# Patient Record
Sex: FEMALE | Race: WHITE | Employment: OTHER | URBAN - METROPOLITAN AREA
[De-identification: names, ages, dates, MRNs, and addresses within clinical notes are randomized per-mention and may not be internally consistent; named-entity substitution may affect disease eponyms.]

---

## 2019-08-23 ENCOUNTER — HOSPITAL ENCOUNTER (OUTPATIENT)
Dept: MAMMOGRAPHY | Age: 74
Discharge: HOME | End: 2019-08-25
Payer: MEDICARE

## 2019-08-23 DIAGNOSIS — Z12.31 SCREENING MAMMOGRAM, ENCOUNTER FOR: ICD-10-CM

## 2019-08-23 PROCEDURE — 77067 SCR MAMMO BI INCL CAD: CPT

## 2020-03-03 ENCOUNTER — OFFICE VISIT (OUTPATIENT)
Dept: PHYSICAL MEDICINE AND REHAB | Age: 75
End: 2020-03-03
Payer: MEDICARE

## 2020-03-03 VITALS — BODY MASS INDEX: 37.21 KG/M2 | WEIGHT: 210 LBS | HEIGHT: 63 IN

## 2020-03-03 PROCEDURE — G8484 FLU IMMUNIZE NO ADMIN: HCPCS | Performed by: PHYSICAL MEDICINE & REHABILITATION

## 2020-03-03 PROCEDURE — 1123F ACP DISCUSS/DSCN MKR DOCD: CPT | Performed by: PHYSICAL MEDICINE & REHABILITATION

## 2020-03-03 PROCEDURE — 3017F COLORECTAL CA SCREEN DOC REV: CPT | Performed by: PHYSICAL MEDICINE & REHABILITATION

## 2020-03-03 PROCEDURE — 1090F PRES/ABSN URINE INCON ASSESS: CPT | Performed by: PHYSICAL MEDICINE & REHABILITATION

## 2020-03-03 PROCEDURE — 95886 MUSC TEST DONE W/N TEST COMP: CPT | Performed by: PHYSICAL MEDICINE & REHABILITATION

## 2020-03-03 PROCEDURE — G8417 CALC BMI ABV UP PARAM F/U: HCPCS | Performed by: PHYSICAL MEDICINE & REHABILITATION

## 2020-03-03 PROCEDURE — 4040F PNEUMOC VAC/ADMIN/RCVD: CPT | Performed by: PHYSICAL MEDICINE & REHABILITATION

## 2020-03-03 PROCEDURE — 95913 NRV CNDJ TEST 13/> STUDIES: CPT | Performed by: PHYSICAL MEDICINE & REHABILITATION

## 2020-03-03 PROCEDURE — G8428 CUR MEDS NOT DOCUMENT: HCPCS | Performed by: PHYSICAL MEDICINE & REHABILITATION

## 2020-03-03 PROCEDURE — 1036F TOBACCO NON-USER: CPT | Performed by: PHYSICAL MEDICINE & REHABILITATION

## 2020-03-03 PROCEDURE — G8400 PT W/DXA NO RESULTS DOC: HCPCS | Performed by: PHYSICAL MEDICINE & REHABILITATION

## 2020-03-03 PROCEDURE — 99202 OFFICE O/P NEW SF 15 MIN: CPT | Performed by: PHYSICAL MEDICINE & REHABILITATION

## 2020-03-05 NOTE — PROGRESS NOTES
[address] [facility]  Electrodiagnostic Laboratory  *Accredited by the [address] [place] [address] with exemplary status  [address] [facility] [address]  Phone: [phone]  Fax: [phone]      Date of Examination: 03/05/20  Patient Name: Matt Sheehan  is a 76y.o. year old female who was seen due to complaints of   Chief Complaint   Patient presents with    Hand Numbness     Generalized numbness and tingling in both hands R>L digits 1-3    Wrist Pain     Right wrist pain present for approximately 1-2 months     that started after no injury. Physical Exam: MSK: There is no joint effusion, deformity, instability, swelling, erythema or warmth. AROM is full in the spine and extremities. +Tinel right wrist. Atrophy present right thenar. Neurologic:  No focal sensorimotor deficit. Reflexes 2+ and symmetric. Gait is normal.    Impression:   1. Polyneuropathy        Plan:   · EMG is indicated to evaluate the above diagnosis. Orders Placed This Encounter   Procedures    EMG     Standing Status:   Future     Standing Expiration Date:   3/3/2021     Order Specific Question:   Which body part? Answer:   BUE     · EMG was done today and showed right carpal tunnel syndrome, severe. The patient was educated about the diagnosis and the prognosis. · Recommend surgical consultation. · Advised patient to follow up with referring provider. Thank you for allowing me to participate in the care of your patient.       Sincerely,     Nela Stokes

## 2021-02-02 ENCOUNTER — IMMUNIZATION (OUTPATIENT)
Dept: PRIMARY CARE CLINIC | Age: 76
End: 2021-02-02
Payer: MEDICARE

## 2021-02-02 DIAGNOSIS — Z23 NEED FOR VACCINATION: Primary | ICD-10-CM

## 2021-02-02 PROCEDURE — 91300 COVID-19, PFIZER VACCINE 30MCG/0.3ML DOSE: CPT | Performed by: NURSE PRACTITIONER

## 2021-02-02 PROCEDURE — 0001A COVID-19, PFIZER VACCINE 30MCG/0.3ML DOSE: CPT | Performed by: NURSE PRACTITIONER

## 2021-02-23 ENCOUNTER — IMMUNIZATION (OUTPATIENT)
Dept: PRIMARY CARE CLINIC | Age: 76
End: 2021-02-23
Payer: MEDICARE

## 2021-02-23 PROCEDURE — 0002A COVID-19, PFIZER VACCINE 30MCG/0.3ML DOSE: CPT | Performed by: NURSE PRACTITIONER

## 2021-02-23 PROCEDURE — 91300 COVID-19, PFIZER VACCINE 30MCG/0.3ML DOSE: CPT | Performed by: NURSE PRACTITIONER

## 2021-10-03 ENCOUNTER — HOSPITAL ENCOUNTER (EMERGENCY)
Age: 76
Discharge: HOME | End: 2021-10-03
Payer: MEDICARE

## 2021-10-03 ENCOUNTER — APPOINTMENT (OUTPATIENT)
Dept: GENERAL RADIOLOGY | Age: 76
End: 2021-10-03
Payer: MEDICARE

## 2021-10-03 VITALS
SYSTOLIC BLOOD PRESSURE: 111 MMHG | HEART RATE: 97 BPM | DIASTOLIC BLOOD PRESSURE: 78 MMHG | BODY MASS INDEX: 38.26 KG/M2 | RESPIRATION RATE: 18 BRPM | OXYGEN SATURATION: 97 % | TEMPERATURE: 97.3 F | WEIGHT: 216 LBS

## 2021-10-03 DIAGNOSIS — S63.91XA SPRAIN OF RIGHT HAND, INITIAL ENCOUNTER: Primary | ICD-10-CM

## 2021-10-03 PROCEDURE — 99211 OFF/OP EST MAY X REQ PHY/QHP: CPT

## 2021-10-03 PROCEDURE — 73110 X-RAY EXAM OF WRIST: CPT

## 2021-10-03 PROCEDURE — 73130 X-RAY EXAM OF HAND: CPT

## 2021-10-03 RX ORDER — LISINOPRIL 10 MG/1
10 TABLET ORAL DAILY
COMMUNITY

## 2021-10-03 RX ORDER — ATORVASTATIN CALCIUM 20 MG/1
20 TABLET, FILM COATED ORAL DAILY
COMMUNITY

## 2021-10-03 RX ORDER — METFORMIN HYDROCHLORIDE 500 MG/1
500 TABLET, EXTENDED RELEASE ORAL
COMMUNITY

## 2021-10-03 RX ORDER — LEVOTHYROXINE SODIUM 88 UG/1
88 TABLET ORAL DAILY
COMMUNITY

## 2021-10-03 RX ORDER — OMEPRAZOLE 40 MG/1
40 CAPSULE, DELAYED RELEASE ORAL DAILY
COMMUNITY

## 2021-10-03 ASSESSMENT — PAIN DESCRIPTION - ORIENTATION: ORIENTATION: RIGHT

## 2021-10-03 ASSESSMENT — PAIN SCALES - GENERAL: PAINLEVEL_OUTOF10: 4

## 2021-10-03 ASSESSMENT — PAIN DESCRIPTION - LOCATION: LOCATION: HAND

## 2021-10-03 ASSESSMENT — PAIN DESCRIPTION - DESCRIPTORS: DESCRIPTORS: ACHING

## 2021-10-03 ASSESSMENT — PAIN DESCRIPTION - PAIN TYPE: TYPE: ACUTE PAIN

## 2021-10-03 NOTE — ED PROVIDER NOTES
Department of Emergency Medicine  [address] [place] [address]  Provider Note  Admit Date/Time: 10/3/2021 12:38 PM  Room: 04/04  MRN: [id number]  Chief Complaint: Hand Injury (right hand/wrist pain in the back of her hand started this AM, pushed herself up to get out of bed)       History of Present Illness   Source of history provided by: Patient. History/Exam Limitations: None. Marlene Lopez is a 68 y.o. female with a history of hypertension, dyslipidemia, and DM. She reports she was getting out of bed this morning when she pushed herself up with her right hand and felt some pain on the dorsal wrist and mid hand which has persisted since. There was no fall or direct injury. Denies any distal paresthesias or weakness. ROS    Pertinent positives and negatives are stated within HPI, all other systems reviewed and are negative. Past Surgical History:   Procedure Laterality Date    BLADDER SUSPENSION      CARPAL TUNNEL RELEASE      FRACTURE SURGERY Left     ashley and screws leg    HERNIA REPAIR      HYSTERECTOMY      JOINT REPLACEMENT      KIDNEY STONE SURGERY      THYROID SURGERY     Social History:  reports that she has never smoked. She has never used smokeless tobacco.  Family History: family history is not on file. Allergies: Omnipen [ampicillin], Penicillins, and Terramycin [oxytetracycline]    Physical Exam   Oxygen Saturation Interpretation: Normal.   ED Triage Vitals   BP Temp Temp src Pulse Resp SpO2 Height Weight   -- -- -- -- -- -- -- --     Physical Exam   Physical Exam:  General: Vitals noted, no distress. Afebrile. Cardiac: Regular, rate, rhythm, no murmur. Pulmonary: Lungs clear bilaterally with good aeration. No adventitious breath sounds. Abdomen: Soft, nontender, nonsurgical. No peritoneal signs. Normoactive bowel sounds.    Extremities: Exam of the right wrist shows some tenderness over the dorsal mid wrist as well as over the third and fourth metacarpal regions. The skin is intact. Is neurovascularly intact distally including full strength with flexion and extension. No obvious tendon injury. Is nontender over the remainder of the wrist and hand. Nontender of the anatomic snuffbox as well. No discomfort with axial loading of the thumb. The remainder of the extremity is nontender as well. Skin: No rash. Neuro: No gross neurologic deficits. Lab / Imaging Results   (All laboratory and radiology results have been personally reviewed by myself)  Labs:  No results found for this visit on 10/03/21. Imaging: All Radiology results interpreted by Radiologist unless otherwise noted. XR HAND RIGHT (MIN 3 VIEWS)   Final Result   Osteopenia      Degenerative changes at the IP joint of the thumb      Otherwise no acute findings         XR WRIST RIGHT (MIN 3 VIEWS)   Final Result   Osteopenia      Degenerative changes at the radiocarpal joint space             ED Course / Medical Decision Making   Medications - No data to display       Consult(s):   None    Procedure(s):   None. Differential Diagnosis: Is extensive but includes fracture, dislocation, nonvisualized/occult fracture, tendon/ligament injury, foreign body, soft tissue injury, etc.    MDM:   This is a 68 y.o. female who presents with the above history. On exam, there is some tenderness over the dorsal mid wrist and third and fourth metacarpal regions but is neurovascularly intact distally. No evidence of neurovascular nor tendon injury. X-rays show no fracture or dislocation. Will be given a Velcro wrist splint. Home-going with NSAIDs and orthopedic follow-up. Counseling: I discussed the differential, results and discharge plan with the patient and/or family/friend/caregiver if present. I emphasized the importance of follow-up with the physician I referred them to in the timeframe recommended. I explained reasons for the patient to return to the Emergency Department.  Additional verbal discharge instructions were also given and discussed with the patient to supplement those generated by the EMR. We also discussed medications that were prescribed (if any) including common side effects and interactions. The patient was advised to abstain from driving, operating heavy machinery or making significant decisions while taking medications such as opiates and muscle relaxers that may impair this. All questions were addressed. They understand return precautions and discharge instructions. The patient and/or family/friend/caregiver expressed understanding. Assessment      1. Sprain of right hand, initial encounter      Plan   Discharge to home and advised to contact Anna Marie Mathew,  [address] [facility] [address] [facility]  [phone]      As needed   Patient condition is good    New Medications     New Prescriptions    No medications on file     Electronically signed by CASE Ann   DD: 10/3/21  **This report was transcribed using voice recognition software. Every effort was made to ensure accuracy; however, inadvertent computerized transcription errors may be present.   END OF ED PROVIDER NOTE          Levander Spurling [address] [place], [place]  10/03/21 [address] [facility]  10/03/21 [id number]

## 2021-12-07 ENCOUNTER — IMMUNIZATION (OUTPATIENT)
Dept: PRIMARY CARE CLINIC | Age: 76
End: 2021-12-07
Payer: MEDICARE

## 2021-12-07 PROCEDURE — 0064A COVID-19, MODERNA BOOSTER VACCINE 0.25ML DOSE: CPT | Performed by: NURSE PRACTITIONER

## 2021-12-07 PROCEDURE — 91306 COVID-19, MODERNA BOOSTER VACCINE 0.25ML DOSE: CPT | Performed by: NURSE PRACTITIONER

## 2022-03-20 ENCOUNTER — APPOINTMENT (OUTPATIENT)
Dept: GENERAL RADIOLOGY | Age: 77
End: 2022-03-20
Payer: MEDICARE

## 2022-03-20 ENCOUNTER — APPOINTMENT (OUTPATIENT)
Dept: CT IMAGING | Age: 77
End: 2022-03-20
Payer: MEDICARE

## 2022-03-20 ENCOUNTER — HOSPITAL ENCOUNTER (EMERGENCY)
Age: 77
Discharge: ANOTHER HOSPITAL | End: 2022-03-20
Attending: STUDENT IN AN ORGANIZED HEALTH CARE EDUCATION/TRAINING PROGRAM
Payer: MEDICARE

## 2022-03-20 VITALS
BODY MASS INDEX: 38.97 KG/M2 | OXYGEN SATURATION: 99 % | DIASTOLIC BLOOD PRESSURE: 74 MMHG | RESPIRATION RATE: 24 BRPM | SYSTOLIC BLOOD PRESSURE: 125 MMHG | WEIGHT: 220 LBS | TEMPERATURE: 98.1 F | HEART RATE: 90 BPM

## 2022-03-20 DIAGNOSIS — R07.89 CHEST TIGHTNESS: Primary | ICD-10-CM

## 2022-03-20 DIAGNOSIS — N30.00 ACUTE CYSTITIS WITHOUT HEMATURIA: ICD-10-CM

## 2022-03-20 LAB
ALBUMIN SERPL-MCNC: 3.9 G/DL (ref 3.5–5.2)
ALP BLD-CCNC: 121 U/L (ref 35–104)
ALT SERPL-CCNC: 12 U/L (ref 0–32)
ANION GAP SERPL CALCULATED.3IONS-SCNC: 14 MMOL/L (ref 7–16)
AST SERPL-CCNC: 14 U/L (ref 0–31)
BACTERIA: ABNORMAL /HPF
BASOPHILS ABSOLUTE: 0.05 E9/L (ref 0–0.2)
BASOPHILS RELATIVE PERCENT: 0.5 % (ref 0–2)
BILIRUB SERPL-MCNC: 0.2 MG/DL (ref 0–1.2)
BILIRUBIN URINE: NEGATIVE
BLOOD, URINE: NEGATIVE
BUN BLDV-MCNC: 13 MG/DL (ref 6–23)
CALCIUM SERPL-MCNC: 9 MG/DL (ref 8.6–10.2)
CHLORIDE BLD-SCNC: 100 MMOL/L (ref 98–107)
CLARITY: ABNORMAL
CO2: 23 MMOL/L (ref 22–29)
COLOR: YELLOW
CREAT SERPL-MCNC: 0.6 MG/DL (ref 0.5–1)
D DIMER: <200 NG/ML DDU
EOSINOPHILS ABSOLUTE: 0.2 E9/L (ref 0.05–0.5)
EOSINOPHILS RELATIVE PERCENT: 1.9 % (ref 0–6)
EPITHELIAL CELLS, UA: ABNORMAL /HPF
GFR AFRICAN AMERICAN: >60
GFR NON-AFRICAN AMERICAN: >60 ML/MIN/1.73
GLUCOSE BLD-MCNC: 194 MG/DL (ref 74–99)
GLUCOSE URINE: NEGATIVE MG/DL
HCT VFR BLD CALC: 38.5 % (ref 34–48)
HEMOGLOBIN: 12.5 G/DL (ref 11.5–15.5)
IMMATURE GRANULOCYTES #: 0.03 E9/L
IMMATURE GRANULOCYTES %: 0.3 % (ref 0–5)
KETONES, URINE: NEGATIVE MG/DL
LEUKOCYTE ESTERASE, URINE: ABNORMAL
LYMPHOCYTES ABSOLUTE: 3.23 E9/L (ref 1.5–4)
LYMPHOCYTES RELATIVE PERCENT: 30.7 % (ref 20–42)
MCH RBC QN AUTO: 28.5 PG (ref 26–35)
MCHC RBC AUTO-ENTMCNC: 32.5 % (ref 32–34.5)
MCV RBC AUTO: 87.7 FL (ref 80–99.9)
METER GLUCOSE: 196 MG/DL (ref 74–99)
MONOCYTES ABSOLUTE: 0.63 E9/L (ref 0.1–0.95)
MONOCYTES RELATIVE PERCENT: 6 % (ref 2–12)
NEUTROPHILS ABSOLUTE: 6.37 E9/L (ref 1.8–7.3)
NEUTROPHILS RELATIVE PERCENT: 60.6 % (ref 43–80)
NITRITE, URINE: POSITIVE
PDW BLD-RTO: 14.2 FL (ref 11.5–15)
PH UA: 6.5 (ref 5–9)
PLATELET # BLD: 247 E9/L (ref 130–450)
PMV BLD AUTO: 9.8 FL (ref 7–12)
POTASSIUM REFLEX MAGNESIUM: 3.9 MMOL/L (ref 3.5–5)
PRO-BNP: 115 PG/ML (ref 0–450)
PROTEIN UA: NEGATIVE MG/DL
RBC # BLD: 4.39 E12/L (ref 3.5–5.5)
RBC UA: ABNORMAL /HPF (ref 0–2)
SODIUM BLD-SCNC: 137 MMOL/L (ref 132–146)
SPECIFIC GRAVITY UA: 1.02 (ref 1–1.03)
TOTAL PROTEIN: 7.4 G/DL (ref 6.4–8.3)
TROPONIN, HIGH SENSITIVITY: 18 NG/L (ref 0–9)
TROPONIN, HIGH SENSITIVITY: 33 NG/L (ref 0–9)
TSH SERPL DL<=0.05 MIU/L-ACNC: 2.24 UIU/ML (ref 0.27–4.2)
UROBILINOGEN, URINE: 0.2 E.U./DL
WBC # BLD: 10.5 E9/L (ref 4.5–11.5)
WBC UA: ABNORMAL /HPF (ref 0–5)

## 2022-03-20 PROCEDURE — 87088 URINE BACTERIA CULTURE: CPT

## 2022-03-20 PROCEDURE — 85025 COMPLETE CBC W/AUTO DIFF WBC: CPT

## 2022-03-20 PROCEDURE — 84443 ASSAY THYROID STIM HORMONE: CPT

## 2022-03-20 PROCEDURE — 87077 CULTURE AEROBIC IDENTIFY: CPT

## 2022-03-20 PROCEDURE — 93005 ELECTROCARDIOGRAM TRACING: CPT | Performed by: STUDENT IN AN ORGANIZED HEALTH CARE EDUCATION/TRAINING PROGRAM

## 2022-03-20 PROCEDURE — 2580000003 HC RX 258: Performed by: STUDENT IN AN ORGANIZED HEALTH CARE EDUCATION/TRAINING PROGRAM

## 2022-03-20 PROCEDURE — 87186 SC STD MICRODIL/AGAR DIL: CPT

## 2022-03-20 PROCEDURE — 36415 COLL VENOUS BLD VENIPUNCTURE: CPT

## 2022-03-20 PROCEDURE — 84484 ASSAY OF TROPONIN QUANT: CPT

## 2022-03-20 PROCEDURE — 6360000002 HC RX W HCPCS: Performed by: STUDENT IN AN ORGANIZED HEALTH CARE EDUCATION/TRAINING PROGRAM

## 2022-03-20 PROCEDURE — 81001 URINALYSIS AUTO W/SCOPE: CPT

## 2022-03-20 PROCEDURE — 85378 FIBRIN DEGRADE SEMIQUANT: CPT

## 2022-03-20 PROCEDURE — 6370000000 HC RX 637 (ALT 250 FOR IP): Performed by: STUDENT IN AN ORGANIZED HEALTH CARE EDUCATION/TRAINING PROGRAM

## 2022-03-20 PROCEDURE — 71045 X-RAY EXAM CHEST 1 VIEW: CPT

## 2022-03-20 PROCEDURE — 96374 THER/PROPH/DIAG INJ IV PUSH: CPT

## 2022-03-20 PROCEDURE — 80053 COMPREHEN METABOLIC PANEL: CPT

## 2022-03-20 PROCEDURE — 99285 EMERGENCY DEPT VISIT HI MDM: CPT

## 2022-03-20 PROCEDURE — 70450 CT HEAD/BRAIN W/O DYE: CPT

## 2022-03-20 PROCEDURE — 82962 GLUCOSE BLOOD TEST: CPT

## 2022-03-20 PROCEDURE — 83880 ASSAY OF NATRIURETIC PEPTIDE: CPT

## 2022-03-20 RX ORDER — CEFDINIR 300 MG/1
300 CAPSULE ORAL 2 TIMES DAILY
Qty: 20 CAPSULE | Refills: 0 | Status: SHIPPED | OUTPATIENT
Start: 2022-03-20 | End: 2022-03-30

## 2022-03-20 RX ORDER — ASPIRIN 81 MG/1
324 TABLET, CHEWABLE ORAL ONCE
Status: DISCONTINUED | OUTPATIENT
Start: 2022-03-20 | End: 2022-03-20 | Stop reason: HOSPADM

## 2022-03-20 RX ORDER — ASPIRIN 81 MG/1
324 TABLET, CHEWABLE ORAL ONCE
Status: COMPLETED | OUTPATIENT
Start: 2022-03-20 | End: 2022-03-20

## 2022-03-20 RX ORDER — 0.9 % SODIUM CHLORIDE 0.9 %
500 INTRAVENOUS SOLUTION INTRAVENOUS ONCE
Status: COMPLETED | OUTPATIENT
Start: 2022-03-20 | End: 2022-03-20

## 2022-03-20 RX ADMIN — SODIUM CHLORIDE 500 ML: 9 INJECTION, SOLUTION INTRAVENOUS at 08:45

## 2022-03-20 RX ADMIN — WATER 1000 MG: 1 INJECTION INTRAMUSCULAR; INTRAVENOUS; SUBCUTANEOUS at 08:44

## 2022-03-20 RX ADMIN — ASPIRIN 81 MG 324 MG: 81 TABLET ORAL at 08:43

## 2022-03-20 ASSESSMENT — ENCOUNTER SYMPTOMS
VOMITING: 0
EYE REDNESS: 0
WHEEZING: 0
SHORTNESS OF BREATH: 1
BACK PAIN: 0
SINUS PRESSURE: 0
NAUSEA: 0
EYE PAIN: 0
SORE THROAT: 0
DIARRHEA: 0
COUGH: 0
EYE DISCHARGE: 0
ABDOMINAL DISTENTION: 0

## 2022-03-20 ASSESSMENT — PAIN SCALES - GENERAL
PAINLEVEL_OUTOF10: 0
PAINLEVEL_OUTOF10: 0

## 2022-03-20 NOTE — CONSULTS
[address]                                  CONSULTATION    PATIENT NAME: Olga White                     :        1945  MED REC NO:   [id number]                            ROOM:       09  ACCOUNT NO:   [de-identified]                           ADMIT DATE: 2022  PROVIDER:     Asuncion Hdez MD    CONSULT DATE:  2022    HISTORY OF PRESENT ILLNESS:  The patient is a 27-year-old lady who I  apparently saw in November of last year in my office for preop cardiac  evaluation prior to cataract surgery. According to her, she had some  abnormality on the EKG done prior to her surgery. So she was referred  to me. She had a stress test at that time showing no evidence of  ischemia. I did not see her since then. The patient presented to the emergency room early this morning with  complaint of chest pain. She stated that she went to sleep, feeling okay. She woke up around 4  o'clock to go to the bathroom. She started to feel tightness, like  chest pain in the midsternal area while in the bathroom. She felt  somewhat lightheaded. There was no radiation of the pain. She went back to her bed. She sat down. The pain started to subside  after about 5 or 7 minutes. She got concerned with this episode of chest pain and she decided to  come to the emergency room. Her troponin high-sensitivity was elevated initially around 18. Repeat  sets came more elevated at 33. Cardiac consult was requested. I came to see the patient while she was still in emergency room. She  was chest pain free at that time. The patient does not recall having this complaint of chest pain before. PAST MEDICAL HISTORY:  As above plus type 2 diabetes. Hyperlipidemia. Hypothyroidism. HABITS:  No history of smoking or excessive alcohol drinking.     REVIEW OF SYSTEMS:  CONSTITUTIONAL:  No fever or chills. HEENT:  No nosebleed. No hearing problem. No double vision. No  stridor. No hoarseness of the voice. CARDIAC:  Complaint of chest pain. No palpitation. PULMONARY:  Some shortness of breath with activities. No orthopnea or  PND. GASTROENTEROLOGY:  No abdominal pain. No vomiting. No diarrhea. GENITOURINARY:  No dysuria or frequency. No bladder or kidney tumor. NEUROLOGIC:  No clear history of seizure or stroke. No syncope. HEMATOLOGY:  No bleeding disorder. No adenopathy. ENDOCRINOLOGY:  No polyuria or polydipsia. SKIN:  No skin disorder. MUSCULOSKELETAL:  The patient has some arthritis. PSYCHIATRIC:  No depression or suicidal ideation. PHYSICAL EXAMINATION:  GENERAL:  The patient was lying in a stretcher in the emergency room. Her son was next to her. She was chest pain free. VITAL SIGNS:  Blood pressure 141/75, pulse around 90, temperature 98. 1. NECK:  No JVD. HEART:  Regular S1 and S2. No S3. Systolic murmur, 1/6, heard best at  the left sternal border. LUNGS:  No rales, no wheezing. ABDOMEN:  Soft, nontender. EXTREMITIES:  No edema, cyanosis, or clubbing. NEUROLOGIC:  Alert and awake with no focal deficits. EKG in the ER showing sinus rhythm with no acute ischemic changes. LABORATORY DATA:  Initial troponin is 18, repeat one is 33. WBC 10.5,  hemoglobin 12.5, platelet count 371. Sodium 137, potassium 3.9, BUN 13,  creatinine 0.6. ProBNP is 115. TSH 2.2. Chest x-ray is unremarkable. IMPRESSION:  Chest pain. The patient presented with new-onset chest pain lasted few minutes this  morning. She had mild up trending troponin elevation, more consistent with  subendocardial infarction. Her EKG is not showing acute ischemic changes. The patient has multiple cardiac risk factors with hyperlipidemia and  type 2 diabetes. She had stress test done about 5 months ago showing no clear evidence of  ischemia.     The patient needs further coronary workup including either coronary CTA  or even cardiac catheterization. Both tests cannot be done at [facility] and the ER physician  here, Dr. Sami Suggs, recommended the patient being transferred to  [facility] for further coronary workup and I feel this is a good  idea. Arrangement was made for the patient to be transferred and I will  continue to see her at [facility].    Meanwhile we will give the patient a dose of Lovenox subcu. She already  got her aspirin. Long discussion with the son and the patient about all above. All their  questions were answered.         Ceferino Ramon MD    D: 03/20/2022 10:04:14       T: 03/20/2022 10:07:54     [email or web address]  Job#: [id number]     Doc#: [id number]    CC:

## 2022-03-20 NOTE — ED PROVIDER NOTES
Corwin aCldwell is a 68 y.o. female with a PMHx significant for DM, HLD, DM, thyroid who presents for evaluation of chest discomfort, feeling off, beginning prior to arrival.  The complaint has been persistent, moderate in severity, and worsened by nothing. The patient states that she woke up this morning at 0 400 went to the bathroom. Notes when she was going to the bathroom she felt a little lightheaded and just off. She notes she had a short episode of some chest discomfort which has not subsided. States that currently she just feels off. She is unable to specify beyond that. Notes that she recently did have cataract surgery and her colors have been much brighter than prior. Denies any nausea, vomiting, diarrhea, constipation, chest pain, but does states she does feel some shortness of breath at this time which is new. States she went to bed feeling normal yesterday. The history is provided by the patient and medical records. Review of Systems   Constitutional: Negative for chills and fever. HENT: Negative for ear pain, sinus pressure and sore throat. Eyes: Negative for pain, discharge and redness. Respiratory: Positive for shortness of breath. Negative for cough and wheezing. Cardiovascular: Negative for chest pain. Gastrointestinal: Negative for abdominal distention, diarrhea, nausea and vomiting. Genitourinary: Negative for dysuria and frequency. Musculoskeletal: Negative for arthralgias and back pain. Skin: Negative for rash and wound. Neurological: Positive for light-headedness. Negative for weakness and headaches. Hematological: Negative for adenopathy. All other systems reviewed and are negative. Physical Exam  Vitals and nursing note reviewed. Constitutional:       General: She is not in acute distress. Appearance: Normal appearance. She is well-developed. She is not ill-appearing. HENT:      Head: Normocephalic and atraumatic.       Right Ear: External ear normal.      Left Ear: External ear normal.   Eyes:      General:         Right eye: No discharge. Left eye: No discharge. Extraocular Movements: Extraocular movements intact. Conjunctiva/sclera: Conjunctivae normal.   Cardiovascular:      Rate and Rhythm: Normal rate and regular rhythm. Heart sounds: Normal heart sounds. No murmur heard. Pulmonary:      Effort: Pulmonary effort is normal. No respiratory distress. Breath sounds: Normal breath sounds. No stridor. Abdominal:      General: There is no distension. Palpations: Abdomen is soft. There is no mass. Tenderness: There is no abdominal tenderness. Hernia: No hernia is present. Musculoskeletal:         General: No swelling, tenderness or deformity. Normal range of motion. Cervical back: Normal range of motion and neck supple. Skin:     General: Skin is warm. Coloration: Skin is not jaundiced or pale. Findings: No rash. Neurological:      General: No focal deficit present. Mental Status: She is alert and oriented to person, place, and time. Cranial Nerves: No cranial nerve deficit. Coordination: Coordination normal.          Procedures     [facility]     ED Course as of 03/20/22 0934   Sun Mar 20, 2022   0553 EKG: This EKG is signed and interpreted by me. Rate: 99  Rhythm: Sinus  Interpretation: non-specific EKG, no ST ovation, depressions, DC 28, QRS 82, QTc 474, nonspecific T waves  Comparison: no previous EKG available      [BB]   7083 Patient re-evaluated. States that she does feel improved at this time. Notes that her chest discomfort was only for about five minutes and felt like chest tightness. [BB]   [id number] Notes that she just saw Dr. Jose Carranza in November for clearance for her surgery. She had a negative echo and stress test at that point in time. [BB]   0811 8:11 AM EDT  I received this patient at sign out from Dr. Han Lebron.   I have discussed the patient's initial exam, treatment and plan of care with the out going physician. I have introduced my self to the patient / family and have answered their questions to this point. I have examined the patient myself and reviewed ordered tests / medications and  reviewed any available results to this point. If a resident is involved in the Emergency Department care, I have discussed my findings and plan with them as well. [NC]   [id number] Patient resting comfortably in the bed in no distress, she states she has no chest tightness or pain right now and no shortness of breath. She describes episode of waking up this morning, going to the bathroom about 4 AM and developing a chest tightness across her chest mostly midsternal slightly to the left with no particular migration or radiation but felt diaphoretic, clammy, mildly short of breath and lightheaded with the episode lasted several minutes and started to come down on its own, has since completely resolved. Has never had a heart catheterization although about 6 months ago had a stress test.  No recent travel or surgeries, no leg pain or swelling. No history of blood clots. No current symptoms. Troponin noted to be elevating mildly and changing during her stay here. [NC]   [id number]   EKG reviewed, normal sinus rhythm with no acute ischemic changes. [NC]   4010 Case discussed with Dr. Hitesh Stovall, detailed over given, does recommend consulting with her cardiologist Dr. Jose Luis Boogie, he will admit the patient if requested by cardiology [NC]   0819 Case discussed with Dr. Jose Luis Boogie, detailed overview given, he does recommend patient be transferred to [facility] for further cardiac evaluation not available at [facility] if possible. [NC]   8104 Case discussed with Samson Saunders at [facility].  Case discussed. She will contact admitting physician for Dr. Yang Leach at [facility], they will either have them accept or have him call me for report as needed.  [NC]      ED Course User Index  [BB] Izaiah Brady DO  [NC] Benita Fish DO   [facility] accepted the patient, did not request call back or report conversation from me further. ED Course as of 03/20/22 0934   Sun Mar 20, 2022   0553 EKG: This EKG is signed and interpreted by me. Rate: 99  Rhythm: Sinus  Interpretation: non-specific EKG, no ST ovation, depressions, MS 28, QRS 82, QTc 474, nonspecific T waves  Comparison: no previous EKG available      [BB]   0726 Patient re-evaluated. States that she does feel improved at this time. Notes that her chest discomfort was only for about five minutes and felt like chest tightness. [BB]   [id number] Notes that she just saw Dr. Drake Coombs in November for clearance for her surgery. She had a negative echo and stress test at that point in time. [BB]   0811 8:11 AM EDT  I received this patient at sign out from Dr. Phani Turner. I have discussed the patient's initial exam, treatment and plan of care with the out going physician. I have introduced my self to the patient / family and have answered their questions to this point. I have examined the patient myself and reviewed ordered tests / medications and  reviewed any available results to this point. If a resident is involved in the Emergency Department care, I have discussed my findings and plan with them as well. [NC]   [id number] Patient resting comfortably in the bed in no distress, she states she has no chest tightness or pain right now and no shortness of breath. She describes episode of waking up this morning, going to the bathroom about 4 AM and developing a chest tightness across her chest mostly midsternal slightly to the left with no particular migration or radiation but felt diaphoretic, clammy, mildly short of breath and lightheaded with the episode lasted several minutes and started to come down on its own, has since completely resolved.   Has never had a heart catheterization although about 6 months ago had a stress test.  No recent travel or surgeries, no leg pain or swelling. No history of blood clots. No current symptoms. Troponin noted to be elevating mildly and changing during her stay here. [NC]   [id number]   EKG reviewed, normal sinus rhythm with no acute ischemic changes. [NC]   7593 Case discussed with Dr. Rosalba Weinstein, detailed over given, does recommend consulting with her cardiologist Dr. May Faustin, he will admit the patient if requested by cardiology [NC]   0827 Case discussed with Dr. May Faustin, detailed overview given, he does recommend patient be transferred to [facility] for further cardiac evaluation not available at [facility] if possible. [NC]   0075 Case discussed with Francisco J Be supervisor at [facility].  Case discussed. She will contact admitting physician for Dr. Tristian Enrique at [facility], they will either have them accept or have him call me for report as needed. [NC]      ED Course User Index  [BB] Caroline Cordova DO  [NC] Lenin Howell,        --------------------------------------------- PAST HISTORY ---------------------------------------------  Past Medical History:  has a past medical history of Diabetes mellitus ([facility] [address] Hyperlipidemia, Hypertension, and Thyroid disease. Past Surgical History:  has a past surgical history that includes Carpal tunnel release; Kidney stone surgery; Thyroid surgery; joint replacement; Hysterectomy; hernia repair; bladder suspension; and fracture surgery (Left). Social History:  reports that she has never smoked. She has never used smokeless tobacco.    Family History: family history is not on file. The patients home medications have been reviewed.     Allergies: Omnipen [ampicillin], Penicillins, and Terramycin [oxytetracycline]    -------------------------------------------------- RESULTS -------------------------------------------------    Lab  Results for orders placed or performed during the hospital encounter of 03/20/22   CBC with Auto Differential   Result Value Ref Range    WBC 10.5 4.5 - 11.5 E9/L    RBC 4.39 3.50 - 5.50 E12/L    Hemoglobin 12.5 11.5 - 15.5 g/dL    Hematocrit 38.5 34.0 - 48.0 %    MCV 87.7 80.0 - 99.9 fL    MCH 28.5 26.0 - 35.0 pg    MCHC 32.5 32.0 - 34.5 %    RDW 14.2 11.5 - 15.0 fL    Platelets 487 726 - 683 E9/L    MPV 9.8 7.0 - 12.0 fL    Neutrophils % 60.6 43.0 - 80.0 %    Immature Granulocytes % 0.3 0.0 - 5.0 %    Lymphocytes % 30.7 20.0 - 42.0 %    Monocytes % 6.0 2.0 - 12.0 %    Eosinophils % 1.9 0.0 - 6.0 %    Basophils % 0.5 0.0 - 2.0 %    Neutrophils Absolute 6.37 1.80 - 7.30 E9/L    Immature Granulocytes # 0.03 E9/L    Lymphocytes Absolute 3.23 1.50 - 4.00 E9/L    Monocytes Absolute 0.63 0.10 - 0.95 E9/L    Eosinophils Absolute 0.20 0.05 - 0.50 E9/L    Basophils Absolute 0.05 0.00 - 0.20 E9/L   Comprehensive Metabolic Panel w/ Reflex to MG   Result Value Ref Range    Sodium 137 132 - 146 mmol/L    Potassium reflex Magnesium 3.9 3.5 - 5.0 mmol/L    Chloride 100 98 - 107 mmol/L    CO2 23 22 - 29 mmol/L    Anion Gap 14 7 - 16 mmol/L    Glucose 194 (H) 74 - 99 mg/dL    BUN 13 6 - 23 mg/dL    CREATININE 0.6 0.5 - 1.0 mg/dL    GFR Non-African American >60 >=60 mL/min/1.73    GFR African American >60     Calcium 9.0 8.6 - 10.2 mg/dL    Total Protein 7.4 6.4 - 8.3 g/dL    Albumin 3.9 3.5 - 5.2 g/dL    Total Bilirubin 0.2 0.0 - 1.2 mg/dL    Alkaline Phosphatase 121 (H) 35 - 104 U/L    ALT 12 0 - 32 U/L    AST 14 0 - 31 U/L   Troponin   Result Value Ref Range    Troponin, High Sensitivity 18 (H) 0 - 9 ng/L   Brain Natriuretic Peptide   Result Value Ref Range    Pro- 0 - 450 pg/mL   D-Dimer, Quantitative   Result Value Ref Range    D-Dimer, Quant <200 ng/mL DDU   TSH   Result Value Ref Range    TSH 2.240 0.270 - 4.200 uIU/mL   Urinalysis with Microscopic   Result Value Ref Range    Color, UA Yellow Straw/Yellow    Clarity, UA CLOUDY (A) Clear    Glucose, Ur Negative Negative mg/dL    Bilirubin Urine Negative Negative Ketones, Urine Negative Negative mg/dL    Specific Gravity, UA 1.020 1.005 - 1.030    Blood, Urine Negative Negative    pH, UA 6.5 5.0 - 9.0    Protein, UA Negative Negative mg/dL    Urobilinogen, Urine 0.2 <2.0 E.U./dL    Nitrite, Urine POSITIVE (A) Negative    Leukocyte Esterase, Urine SMALL (A) Negative    WBC, UA 10-20 (A) 0 - 5 /HPF    RBC, UA 0-1 0 - 2 /HPF    Epithelial Cells, UA RARE /HPF    Bacteria, UA MANY (A) None Seen /HPF   Troponin   Result Value Ref Range    Troponin, High Sensitivity 33 (H) 0 - 9 ng/L   POCT Glucose   Result Value Ref Range    Meter Glucose 196 (H) 74 - 99 mg/dL   EKG 12 Lead   Result Value Ref Range    Ventricular Rate 99 BPM    Atrial Rate 99 BPM    P-R Interval 188 ms    QRS Duration 82 ms    Q-T Interval 370 ms    QTc Calculation (Bazett) 474 ms    P Axis 31 degrees    R Axis 39 degrees    T Axis 42 degrees       Radiology  XR CHEST PORTABLE   Final Result   No acute findings. CT Head WO Contrast   Final Result   No acute findings. ------------------------- NURSING NOTES AND VITALS REVIEWED ---------------------------  Date / Time Roomed:  3/20/2022  5:39 AM  ED Bed Assignment:  09/09    The nursing notes within the ED encounter and vital signs as below have been reviewed. Patient Vitals for the past 24 hrs:   BP Temp Temp src Pulse Resp SpO2 Weight   03/20/22 0910 125/74 -- -- 90 24 99 % --   03/20/22 0723 (!) 111/59 -- -- 94 20 99 % --   03/20/22 0541 (!) 141/75 98.1 °F (36.7 °C) Oral 101 17 99 % 220 lb (99.8 kg)       Oxygen Saturation Interpretation: Normal          I have spoken with the patient and family and discussed todays results, in addition to providing specific details for the plan of care and counseling regarding the diagnosis and prognosis. Their questions are answered at this time and they are agreeable with the plan.           This patient's ED course included: a personal history and physicial examination, re-evaluation prior to disposition, multiple bedside re-evaluations, cardiac monitoring and continuous pulse oximetry    This patient has remained hemodynamically stable, remained unchanged and been closely monitored during their ED course. Please note that the withdrawal or failure to initiate urgent interventions for this patient would likely result in a life threatening deterioration or permanent disability. Accordingly this patient received 0 minutes of critical care time, excluding separately billable procedures. Systems at risk for deterioration include: . Clinical Impression  1. Chest tightness    2. Acute cystitis without hematuria          Disposition  Patient's disposition: Transfer to [facility] to floor  Patient's condition is stable.          Neha Roque,   03/20/22 1111

## 2022-03-20 NOTE — ED NOTES
Patient in from home woke with chest pressure/ pain. Has since resolved. Patient ambulates with a cane and an assist. Patient is not steady on feet.       Emiliano Hough RN  03/20/22 8538

## 2022-03-20 NOTE — ED NOTES
Report called to Laura AYERS at [address] [place] [address]. I made her aware that I found the order for the Lovenox after I opened the chart and the PAS crew had already left with the patient.      Ligia Murphy RN  03/20/22 7292

## 2022-03-21 LAB
EKG ATRIAL RATE: 99 BPM
EKG P AXIS: 31 DEGREES
EKG P-R INTERVAL: 188 MS
EKG Q-T INTERVAL: 370 MS
EKG QRS DURATION: 82 MS
EKG QTC CALCULATION (BAZETT): 474 MS
EKG R AXIS: 39 DEGREES
EKG T AXIS: 42 DEGREES
EKG VENTRICULAR RATE: 99 BPM

## 2022-03-22 LAB
ORGANISM: ABNORMAL
URINE CULTURE, ROUTINE: ABNORMAL

## 2022-09-06 ENCOUNTER — HOSPITAL ENCOUNTER (OUTPATIENT)
Dept: MAMMOGRAPHY | Age: 77
Discharge: HOME | End: 2022-09-08
Payer: MEDICARE

## 2022-09-06 DIAGNOSIS — M81.0 SENILE OSTEOPOROSIS: ICD-10-CM

## 2022-09-06 PROCEDURE — 77080 DXA BONE DENSITY AXIAL: CPT

## 2023-05-07 ENCOUNTER — HOSPITAL ENCOUNTER (EMERGENCY)
Age: 78
Discharge: HOME | End: 2023-05-07
Attending: EMERGENCY MEDICINE
Payer: MEDICARE

## 2023-05-07 ENCOUNTER — HOSPITAL ENCOUNTER (EMERGENCY)
Age: 78
Discharge: HOME | End: 2023-05-07
Payer: MEDICARE

## 2023-05-07 ENCOUNTER — APPOINTMENT (OUTPATIENT)
Dept: CT IMAGING | Age: 78
End: 2023-05-07
Payer: MEDICARE

## 2023-05-07 VITALS
TEMPERATURE: 97.7 F | WEIGHT: 220 LBS | HEART RATE: 75 BPM | RESPIRATION RATE: 20 BRPM | DIASTOLIC BLOOD PRESSURE: 70 MMHG | OXYGEN SATURATION: 98 % | BODY MASS INDEX: 38.97 KG/M2 | SYSTOLIC BLOOD PRESSURE: 122 MMHG

## 2023-05-07 VITALS
DIASTOLIC BLOOD PRESSURE: 64 MMHG | SYSTOLIC BLOOD PRESSURE: 138 MMHG | HEART RATE: 75 BPM | OXYGEN SATURATION: 98 % | TEMPERATURE: 97.1 F | RESPIRATION RATE: 18 BRPM

## 2023-05-07 DIAGNOSIS — R42 LIGHTHEADEDNESS: Primary | ICD-10-CM

## 2023-05-07 DIAGNOSIS — R42 VERTIGO: Primary | ICD-10-CM

## 2023-05-07 LAB
ANION GAP SERPL CALCULATED.3IONS-SCNC: 12 MMOL/L (ref 7–16)
BASOPHILS # BLD: 0.09 E9/L (ref 0–0.2)
BASOPHILS NFR BLD: 0.7 % (ref 0–2)
BUN SERPL-MCNC: 22 MG/DL (ref 6–23)
CALCIUM SERPL-MCNC: 9.2 MG/DL (ref 8.6–10.2)
CHLORIDE SERPL-SCNC: 94 MMOL/L (ref 98–107)
CO2 SERPL-SCNC: 24 MMOL/L (ref 22–29)
CREAT SERPL-MCNC: 0.8 MG/DL (ref 0.5–1)
EKG ATRIAL RATE: 75 BPM
EKG P-R INTERVAL: 354 MS
EKG Q-T INTERVAL: 392 MS
EKG QRS DURATION: 96 MS
EKG QTC CALCULATION (BAZETT): 437 MS
EKG R AXIS: 13 DEGREES
EKG T AXIS: 15 DEGREES
EKG VENTRICULAR RATE: 75 BPM
EOSINOPHIL # BLD: 0.23 E9/L (ref 0.05–0.5)
EOSINOPHIL NFR BLD: 1.7 % (ref 0–6)
ERYTHROCYTE [DISTWIDTH] IN BLOOD BY AUTOMATED COUNT: 14.5 FL (ref 11.5–15)
GLUCOSE SERPL-MCNC: 94 MG/DL (ref 74–99)
HCT VFR BLD AUTO: 44.9 % (ref 34–48)
HGB BLD-MCNC: 13.9 G/DL (ref 11.5–15.5)
IMM GRANULOCYTES # BLD: 0.05 E9/L
IMM GRANULOCYTES NFR BLD: 0.4 % (ref 0–5)
LYMPHOCYTES # BLD: 4.52 E9/L (ref 1.5–4)
LYMPHOCYTES NFR BLD: 33.7 % (ref 20–42)
MCH RBC QN AUTO: 29.5 PG (ref 26–35)
MCHC RBC AUTO-ENTMCNC: 31 % (ref 32–34.5)
MCV RBC AUTO: 95.3 FL (ref 80–99.9)
MONOCYTES # BLD: 0.94 E9/L (ref 0.1–0.95)
MONOCYTES NFR BLD: 7 % (ref 2–12)
NEUTROPHILS # BLD: 7.57 E9/L (ref 1.8–7.3)
NEUTS SEG NFR BLD: 56.5 % (ref 43–80)
PLATELET # BLD AUTO: 191 E9/L (ref 130–450)
PMV BLD AUTO: 10.6 FL (ref 7–12)
POTASSIUM SERPL-SCNC: 5.3 MMOL/L (ref 3.5–5)
RBC # BLD AUTO: 4.71 E12/L (ref 3.5–5.5)
SODIUM SERPL-SCNC: 130 MMOL/L (ref 132–146)
WBC # BLD: 13.4 E9/L (ref 4.5–11.5)

## 2023-05-07 PROCEDURE — 93010 ELECTROCARDIOGRAM REPORT: CPT | Performed by: INTERNAL MEDICINE

## 2023-05-07 PROCEDURE — 70450 CT HEAD/BRAIN W/O DYE: CPT

## 2023-05-07 PROCEDURE — 99211 OFF/OP EST MAY X REQ PHY/QHP: CPT

## 2023-05-07 PROCEDURE — 6370000000 HC RX 637 (ALT 250 FOR IP)

## 2023-05-07 PROCEDURE — 85025 COMPLETE CBC W/AUTO DIFF WBC: CPT

## 2023-05-07 PROCEDURE — 93005 ELECTROCARDIOGRAM TRACING: CPT

## 2023-05-07 PROCEDURE — 80048 BASIC METABOLIC PNL TOTAL CA: CPT

## 2023-05-07 PROCEDURE — 2580000003 HC RX 258

## 2023-05-07 RX ORDER — 0.9 % SODIUM CHLORIDE 0.9 %
500 INTRAVENOUS SOLUTION INTRAVENOUS ONCE
Status: COMPLETED | OUTPATIENT
Start: 2023-05-07 | End: 2023-05-07

## 2023-05-07 RX ORDER — CEPHALEXIN 500 MG/1
TABLET ORAL 3 TIMES DAILY
COMMUNITY

## 2023-05-07 RX ORDER — AMIODARONE HYDROCHLORIDE 200 MG/1
200 TABLET ORAL DAILY
COMMUNITY

## 2023-05-07 RX ORDER — AZELASTINE 1 MG/ML
1 SPRAY, METERED NASAL 2 TIMES DAILY
COMMUNITY

## 2023-05-07 RX ORDER — MEXILETINE HYDROCHLORIDE 150 MG/1
150 CAPSULE ORAL 3 TIMES DAILY
COMMUNITY

## 2023-05-07 RX ORDER — SODIUM CHLORIDE 9 MG/ML
INJECTION, SOLUTION INTRAVENOUS
Status: COMPLETED
Start: 2023-05-07 | End: 2023-05-07

## 2023-05-07 RX ORDER — METOPROLOL TARTRATE 50 MG/1
50 TABLET, FILM COATED ORAL 2 TIMES DAILY
COMMUNITY

## 2023-05-07 RX ORDER — METOLAZONE 2.5 MG/1
2.5 TABLET ORAL DAILY
COMMUNITY

## 2023-05-07 RX ORDER — MECLIZINE HYDROCHLORIDE 25 MG/1
25 TABLET ORAL 3 TIMES DAILY PRN
Qty: 30 TABLET | Refills: 0 | Status: SHIPPED | OUTPATIENT
Start: 2023-05-07 | End: 2023-05-17

## 2023-05-07 RX ORDER — MECLIZINE HCL 12.5 MG/1
25 TABLET ORAL ONCE
Status: COMPLETED | OUTPATIENT
Start: 2023-05-07 | End: 2023-05-07

## 2023-05-07 RX ORDER — ASPIRIN 81 MG/1
81 TABLET ORAL DAILY
COMMUNITY

## 2023-05-07 RX ADMIN — SODIUM CHLORIDE 500 ML: 9 INJECTION, SOLUTION INTRAVENOUS at 13:39

## 2023-05-07 RX ADMIN — Medication 500 ML: at 13:39

## 2023-05-07 RX ADMIN — MECLIZINE 25 MG: 12.5 TABLET ORAL at 13:19

## 2023-05-07 ASSESSMENT — PAIN - FUNCTIONAL ASSESSMENT: PAIN_FUNCTIONAL_ASSESSMENT: NONE - DENIES PAIN

## 2024-03-03 ENCOUNTER — APPOINTMENT (OUTPATIENT)
Dept: GENERAL RADIOLOGY | Age: 79
End: 2024-03-03
Payer: MEDICARE

## 2024-03-03 ENCOUNTER — HOSPITAL ENCOUNTER (EMERGENCY)
Age: 79
Discharge: HOME | End: 2024-03-03
Payer: MEDICARE

## 2024-03-03 VITALS
OXYGEN SATURATION: 98 % | SYSTOLIC BLOOD PRESSURE: 106 MMHG | TEMPERATURE: 98.1 F | RESPIRATION RATE: 18 BRPM | HEART RATE: 74 BPM | DIASTOLIC BLOOD PRESSURE: 64 MMHG | WEIGHT: 225 LBS | BODY MASS INDEX: 39.86 KG/M2

## 2024-03-03 DIAGNOSIS — M17.11 PRIMARY OSTEOARTHRITIS OF RIGHT KNEE: Primary | ICD-10-CM

## 2024-03-03 PROCEDURE — 99211 OFF/OP EST MAY X REQ PHY/QHP: CPT

## 2024-03-03 PROCEDURE — 73560 X-RAY EXAM OF KNEE 1 OR 2: CPT

## 2024-03-03 RX ORDER — NAPROXEN 375 MG/1
375 TABLET ORAL 2 TIMES DAILY WITH MEALS
Qty: 20 TABLET | Refills: 0 | Status: SHIPPED | OUTPATIENT
Start: 2024-03-03

## 2024-03-03 NOTE — ED PROVIDER NOTES
Department of Emergency Medicine   ED  Encounter Note  Admit Date/RoomTime: 3/3/2024  1:58 PM  ED Room:     NAME: Melanie Wiggins  : 1945  MRN: [id number]     Chief Complaint:  Knee Pain (Right knee pain, hurts to bend, started a few days ago, just woke up with pain, no injury)    History of Present Illness       Melanie Wiggins is a 78 y.o. old female who presents to urgent care by private vehicle, for non-traumatic stiffness and swelling to right knee  which occured a few day(s) prior to arrival.  The complaint is due to no known cause.  Since onset the symptoms have been stable.  Patient has no prior history of pain/injury with regards to today's visit.  Her pain is aggraveated by any movement and relieved by nothing, as no treatment has been provided prior to this visit. Her weight bearing ability is: difficult secondary to discomfort. She denies any fall to the ground fever chills or lower leg swelling or tenderness.    ROS   Pertinent positives and negatives are stated within HPI, all other systems reviewed and are negative.    Past Medical History:  has a past medical history of Diabetes mellitus (HCC), Hyperlipidemia, Hypertension, Kidney stone, and Thyroid disease.    Surgical History:  has a past surgical history that includes Carpal tunnel release; Kidney stone surgery; Thyroid surgery; joint replacement; Hysterectomy; hernia repair; bladder suspension; fracture surgery (Left); Cardiac defibrillator placement; eye surgery; and Cardiac catheterization.    Social History:  reports that she has never smoked. She has never used smokeless tobacco.    Family History: family history is not on file.     Allergies: Ciprofloxacin, Omnipen [ampicillin], Penicillins, and Terramycin [oxytetracycline]    Physical Exam   Oxygen Saturation Interpretation: Normal.        ED Triage Vitals   BP Temp Temp src Pulse Respirations SpO2 Height Weight - Scale   24 1404 24 1404 -- 24 1404 24 1404

## 2024-07-05 ENCOUNTER — HOSPITAL ENCOUNTER (EMERGENCY)
Age: 79
Discharge: HOME | End: 2024-07-05
Payer: MEDICARE

## 2024-07-05 ENCOUNTER — APPOINTMENT (OUTPATIENT)
Dept: GENERAL RADIOLOGY | Age: 79
End: 2024-07-05
Payer: MEDICARE

## 2024-07-05 VITALS
OXYGEN SATURATION: 99 % | RESPIRATION RATE: 18 BRPM | DIASTOLIC BLOOD PRESSURE: 78 MMHG | HEART RATE: 78 BPM | TEMPERATURE: 98.6 F | SYSTOLIC BLOOD PRESSURE: 150 MMHG

## 2024-07-05 DIAGNOSIS — M25.552 LEFT HIP PAIN: Primary | ICD-10-CM

## 2024-07-05 PROCEDURE — 99211 OFF/OP EST MAY X REQ PHY/QHP: CPT

## 2024-07-05 PROCEDURE — 73502 X-RAY EXAM HIP UNI 2-3 VIEWS: CPT

## 2024-07-05 RX ORDER — PREDNISONE 20 MG/1
40 TABLET ORAL DAILY
Qty: 10 TABLET | Refills: 0 | Status: SHIPPED | OUTPATIENT
Start: 2024-07-05 | End: 2024-07-10

## 2024-07-05 NOTE — ED PROVIDER NOTES
for this visit on 07/05/24.    RADIOLOGY:  Interpreted by Radiologist.  XR HIP 2-3 VW W PELVIS LEFT   Final Result   Satisfactory alignment identified the prosthesis of the hips bilaterally. No   hardware complication.             ------------------------- NURSING NOTES AND VITALS REVIEWED ---------------------------   The nursing notes within the ED encounter and vital signs as below have been reviewed.   BP (!) 150/78   Pulse 78   Temp 98.6 °F (37 °C)   Resp 18   SpO2 99%   Oxygen Saturation Interpretation: Normal      ---------------------------------------------------PHYSICAL EXAM--------------------------------------      Constitutional/General: Alert and oriented x3, well appearing, non toxic in NAD  Head: Normocephalic and atraumatic  Eyes: PERRL, EOMI  Mouth: Oropharynx clear, handling secretions, no trismus  Neck: Supple, full ROM,   Abdomen: Soft, non tender, non distended, no groin tenderness to palpation on the left.  Extremities: Moves all extremities x 4. Warm and well perfused.  Tenderness palpation over the lateral aspect of the left hip.   No pretibial edema or calf tenderness patient on the left.  2+ dorsalis pedis pulse on left.  Skin: warm and dry without rash  Neurologic: GCS 15,  Psych: Normal Affect      ------------------------------ ED COURSE/MEDICAL DECISION MAKING----------------------  Medications - No data to display      ED COURSE:  ED Course as of 07/05/24 1649 Fri Jul 05, 2024   8829 Patient is a 78-year-old female presenting to the urgent care today with left hip pain.  No injury.  On exam she is nontoxic-appearing, afebrile no acute distress.  Neurovascularly intact.  X-ray of the left hip done at urgent care today does not reveal any acute fracture or dislocation.  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  At this time with plan and outpatient symptom management with prednisone as she is already on naproxen at home.  Advised follow very closely with PCP for  recheck return to the ER with any new or worsening symptoms.  Patient voiced understanding and is agreeable to the above treatment plan. [MS]      ED Course User Index  [MS] Paulina Morataya PA       Medical Decision Making:    See ED course above.     Counseling:   The emergency provider has spoken with the patient and discussed today’s results, in addition to providing specific details for the plan of care and counseling regarding the diagnosis and prognosis.  Questions are answered at this time and they are agreeable with the plan.      --------------------------------- IMPRESSION AND DISPOSITION ---------------------------------    IMPRESSION  1. Left hip pain        DISPOSITION  Disposition: Discharge to home  Patient condition is stable      NOTE: This report was transcribed using voice recognition software. Every effort was made to ensure accuracy; however, inadvertent computerized transcription errors may be present        Paulina Morataya PA  07/05/24 7638

## 2025-06-17 ENCOUNTER — TRANSCRIBE ORDERS (OUTPATIENT)
Dept: ADMINISTRATIVE | Age: 80
End: 2025-06-17

## 2025-06-17 DIAGNOSIS — Z51.81 ENCOUNTER FOR THERAPEUTIC DRUG MONITORING: Primary | ICD-10-CM

## 2025-06-17 DIAGNOSIS — Z79.899 DRUG THERAPY: ICD-10-CM

## 2025-06-17 DIAGNOSIS — I47.20 VENTRICULAR TACHYARRHYTHMIA (HCC): ICD-10-CM

## 2025-07-03 ENCOUNTER — HOSPITAL ENCOUNTER (OUTPATIENT)
Dept: PULMONOLOGY | Age: 80
Discharge: HOME | End: 2025-07-03
Payer: MEDICARE

## 2025-07-03 DIAGNOSIS — I47.20 VENTRICULAR TACHYARRHYTHMIA (HCC): ICD-10-CM

## 2025-07-03 DIAGNOSIS — Z51.81 ENCOUNTER FOR THERAPEUTIC DRUG MONITORING: ICD-10-CM

## 2025-07-03 DIAGNOSIS — Z79.899 DRUG THERAPY: ICD-10-CM

## 2025-07-03 PROCEDURE — 94729 DIFFUSING CAPACITY: CPT

## 2025-07-03 PROCEDURE — 94726 PLETHYSMOGRAPHY LUNG VOLUMES: CPT

## 2025-07-03 PROCEDURE — 94060 EVALUATION OF WHEEZING: CPT

## 2025-07-03 NOTE — PROCEDURES
[address] [address]                           PULMONARY FUNCTION      PATIENT NAME: RUBY CORNEJO               : 1945  MED REC NO: [id number]                        ROOM:   ACCOUNT NO: [id number]                       ADMIT DATE: 2025  PROVIDER: Kenton Vallejo MD      DATE OF PROCEDURE: 2025    SURGEON:  Kenton Vallejo MD    REFERRING PHYSICIAN:  BELL MENDEZ    The spirometry shows normal FVC, FEV1, and FEV1/FVC.  The maximum voluntary ventilation is 41% of predicted value.    According to Z-score criteria, FVC, FEV1, and FEV1/FVC are under the area of curve, within standard deviation of -1.64.  After bronchodilator therapy, there is no improvement seen in the spirometry.    The lung volume shows normal TLC and RV.    The diffusion capacity is normal.    IMPRESSION:  The above study is normal and there is no significant change after bronchodilator therapy.          KENTON VALLEJO MD      D:  2025 13:31:30     T:  2025 13:41:48     CAROLINA/ANTONIO  Job #:  [id number]     Doc#:  [id number]